# Patient Record
Sex: FEMALE | Race: BLACK OR AFRICAN AMERICAN
[De-identification: names, ages, dates, MRNs, and addresses within clinical notes are randomized per-mention and may not be internally consistent; named-entity substitution may affect disease eponyms.]

---

## 2017-04-16 NOTE — NUR
Patient discharged to home in stable condition. Written and verbal after care 
instructions given. Patient verbalizes understanding of instruction.  
ambulatory with a steady gait

## 2017-04-16 NOTE — NUR
PT AMBULATORY TO ER BED 8. PT STATES SHE HAS BEEN HVAING HEART PAIN X 3 DAYS 
AND WHEN CAR DOORS CLOSE HER HEART HURTS MORE, PT STATES HER HEART IS EATING 
ITSELF. PT NOTED ANXIOUS. PT AOX3 RR EVEN AND UNLABORED. NO SOB NOTED. NAD 
NOTED. NO NVD AT THIS TIME. PT GOWNED AND PLACED ON MONITOR WAITING FOR MD BARNES.

## 2017-05-11 NOTE — NUR
AAOX3, CAME TO ER C/O LEFT SIDE CHEST PAIN NON RADIATING SINCE THIS AM. SKIN IS 
WARM AND DRY. ASSISTED TO HOSPITAL GOWN, PLACED ON MONITOR. WILL CONTINUOUS 
MONITOR THE PATIENT. AWAITING MD FOR EVAL.

## 2018-02-12 NOTE — NUR
PT AMBULATORY TO ER BED 16. PT IS C/O HEART PALPITATION, STATES AGGRAVATED BY 
NOISE FROM HER UPSTAIR APARTMENT. HX OF SCHIZOPRENIA. GOWNED AND PLACED ON 
MONITOR. STABLE VITALS. AWAITING MD BARNES.

## 2019-07-04 NOTE — NUR
CHEST PAIN AND HEADACHE X 1 MONTH; WORSE TODAY. PATIENT A/OX4, BREATHING EVEN 
AND UNLABORED, C/O INTERMITTENT CHEST PAIN. ATTACHED TO THE MONITOR.

## 2019-07-04 NOTE — NUR
PIV REMOVED, Patient discharged to home in stable condition. Written and verbal 
after care instructions given. Patient verbalizes understanding of instruction.

## 2021-10-27 NOTE — NUR
C/O LEFT MID BACK PAIN 5/10 STARTED 10/26/21 PIMPLE ON BACK NOTED NO DRAINAGE 
OR REDNESS NOTED. PT IN GOWN AND VSS.

## 2021-10-27 NOTE — NUR
Patient does not wish to proceed with medical care recommended by Dr. Damian. 
Patient given information related to possible complications, up to and 
including death, which could occur as a result of leaving the hospital at this 
time. Patient verbalizes understanding of risks involved due to leaving against 
medical advice. Patient has signed AMA form.

## 2022-06-25 NOTE — NUR
BIBS C/O ATRUAMTIC HEADACHE X1 WEEK PRIMARILY ON THE RIGHT SIDE AND BILATERAL 
SHOULDER PAIN XMULTIPLE WEEKS. PT DENIES ANY INJURIES OR TRAUMA. PT AWAKE AND 
ALERTX4 BREATHING EVEN AND UNLABORED AMBULATORY WITH STEADY GAIT. CHANGED INTO 
GOWN AND PLACED ON MONITOR AND PULSE OX.

## 2022-06-25 NOTE — NUR
Patient discharged in stable condition. Written and verbal after care 
instructions given. Patient verbalizes understanding of instruction.

## 2022-07-22 NOTE — NUR
TO ER BED 3. BIBS FROM STREET C/O R LOWER LEG SWELLING X 8 DAYS. PT IS ALERT 
AND ORIENTED. AMBULATORY WITH STEADY GAIT. BREATHING IS EVEN AND NONLABORED. 
CONNECTED TO MONITOR. AWAITING MD BARNES

## 2022-07-22 NOTE — NUR
-------------------------------------------------------------------------------

           *** Note undone in [facility] - 07/22/22 at 0647 by CRISTINA ***            

-------------------------------------------------------------------------------

Patient discharged to home in stable condition. Written and verbal after care 
instructions given. Patient verbalizes understanding of instruction.

## 2022-07-22 NOTE — NUR
-------------------------------------------------------------------------------

           *** Note shyam in ED - 07/22/22 at 0647 by CRISTINA ***            

-------------------------------------------------------------------------------

IV removed. Catheter intact and site benign. Pressure and 4x4 applied to site. 
No bleeding noted.

## 2022-08-18 NOTE — NUR
Patient came in to the er c/o ringing of the R ear and ble edema x 1 month. On 
room air, breathing evenly and unlabored. Ambulatory with steady gait. Kept 
comfortable, will continue to monitor accordingly.

## 2022-09-23 ENCOUNTER — HOSPITAL ENCOUNTER (EMERGENCY)
Dept: HOSPITAL 54 - ER | Age: 67
Discharge: HOME | End: 2022-09-23
Payer: MEDICARE

## 2022-09-23 VITALS — BODY MASS INDEX: 32.96 KG/M2 | WEIGHT: 210 LBS | HEIGHT: 67 IN

## 2022-09-23 VITALS — SYSTOLIC BLOOD PRESSURE: 155 MMHG | DIASTOLIC BLOOD PRESSURE: 70 MMHG

## 2022-09-23 DIAGNOSIS — M19.90: ICD-10-CM

## 2022-09-23 DIAGNOSIS — Z79.899: ICD-10-CM

## 2022-09-23 DIAGNOSIS — K21.9: ICD-10-CM

## 2022-09-23 DIAGNOSIS — R60.0: Primary | ICD-10-CM

## 2022-09-23 DIAGNOSIS — Z59.00: ICD-10-CM

## 2022-09-23 LAB
ALBUMIN SERPL BCP-MCNC: 2.8 G/DL (ref 3.4–5)
ALP SERPL-CCNC: 97 U/L (ref 46–116)
ALT SERPL W P-5'-P-CCNC: 15 U/L (ref 12–78)
AST SERPL W P-5'-P-CCNC: 20 U/L (ref 15–37)
BASOPHILS # BLD AUTO: 0 K/UL (ref 0–0.2)
BASOPHILS NFR BLD AUTO: 0.2 % (ref 0–2)
BILIRUB DIRECT SERPL-MCNC: 0.1 MG/DL (ref 0–0.2)
BILIRUB SERPL-MCNC: 0.2 MG/DL (ref 0.2–1)
BILIRUB UR QL STRIP: NEGATIVE
BUN SERPL-MCNC: 11 MG/DL (ref 7–18)
CALCIUM SERPL-MCNC: 8.9 MG/DL (ref 8.5–10.1)
CHLORIDE SERPL-SCNC: 105 MMOL/L (ref 98–107)
CO2 SERPL-SCNC: 29 MMOL/L (ref 21–32)
COLOR UR: YELLOW
CREAT SERPL-MCNC: 0.8 MG/DL (ref 0.6–1.3)
EOSINOPHIL NFR BLD AUTO: 3.1 % (ref 0–6)
GLUCOSE SERPL-MCNC: 87 MG/DL (ref 74–106)
GLUCOSE UR STRIP-MCNC: NEGATIVE MG/DL
HCT VFR BLD AUTO: 35 % (ref 33–45)
HGB BLD-MCNC: 10.5 G/DL (ref 11.5–14.8)
LEUKOCYTE ESTERASE UR QL STRIP: NEGATIVE
LYMPHOCYTES NFR BLD AUTO: 2.1 K/UL (ref 0.8–4.8)
LYMPHOCYTES NFR BLD AUTO: 26.4 % (ref 20–44)
MCHC RBC AUTO-ENTMCNC: 30 G/DL (ref 31–36)
MCV RBC AUTO: 81 FL (ref 82–100)
MONOCYTES NFR BLD AUTO: 0.6 K/UL (ref 0.1–1.3)
MONOCYTES NFR BLD AUTO: 7 % (ref 2–12)
NEUTROPHILS # BLD AUTO: 5.1 K/UL (ref 1.8–8.9)
NEUTROPHILS NFR BLD AUTO: 63.3 % (ref 43–81)
NITRITE UR QL STRIP: NEGATIVE
PH UR STRIP: 6 [PH] (ref 5–8)
PLATELET # BLD AUTO: 221 K/UL (ref 150–450)
POTASSIUM SERPL-SCNC: 3.5 MMOL/L (ref 3.5–5.1)
PROT SERPL-MCNC: 7.1 G/DL (ref 6.4–8.2)
PROT UR QL STRIP: NEGATIVE MG/DL
RBC # BLD AUTO: 4.3 MIL/UL (ref 4–5.2)
SODIUM SERPL-SCNC: 140 MMOL/L (ref 136–145)
UROBILINOGEN UR STRIP-MCNC: 0.2 EU/DL
WBC NRBC COR # BLD AUTO: 8 K/UL (ref 4.3–11)

## 2022-09-23 SDOH — ECONOMIC STABILITY - HOUSING INSECURITY: HOMELESSNESS UNSPECIFIED: Z59.00

## 2022-09-23 NOTE — NUR
PATIENT BIBSELF C/O LOWER EXTREMITY PAIN BILATERAL FOR THE PAST FEW DAYS, PER 
PATIENT FELT WORSE THIS MORNING. PATIENT IS A/O X 4, RR EVEN AND UNLABORED NO 
SOB NOTED. PATIENT TAKEN TO ER BED 2. PATIENT CONNECTED TO MONITORS. VSS. NO 
ACUTE DISTRESS NOTED. PATIENT ABLE TO AMBULATE WITH STEADY GAIT.

## 2022-10-02 ENCOUNTER — HOSPITAL ENCOUNTER (EMERGENCY)
Dept: HOSPITAL 54 - ER | Age: 67
Discharge: HOME | End: 2022-10-02
Payer: MEDICARE

## 2022-10-02 VITALS — DIASTOLIC BLOOD PRESSURE: 97 MMHG | SYSTOLIC BLOOD PRESSURE: 187 MMHG

## 2022-10-02 VITALS — BODY MASS INDEX: 25.9 KG/M2 | HEIGHT: 67 IN | WEIGHT: 165 LBS

## 2022-10-02 DIAGNOSIS — Z53.21: Primary | ICD-10-CM

## 2023-02-15 ENCOUNTER — HOSPITAL ENCOUNTER (EMERGENCY)
Dept: HOSPITAL 54 - ER | Age: 68
Discharge: LEFT BEFORE DISCHARGE | End: 2023-02-15
Payer: MEDICARE

## 2023-02-15 DIAGNOSIS — Z53.21: Primary | ICD-10-CM

## 2023-02-23 ENCOUNTER — HOSPITAL ENCOUNTER (EMERGENCY)
Dept: HOSPITAL 54 - ER | Age: 68
LOS: 1 days | Discharge: HOME | End: 2023-02-24
Payer: MEDICARE

## 2023-02-23 VITALS — WEIGHT: 160 LBS | BODY MASS INDEX: 26.66 KG/M2 | HEIGHT: 65 IN

## 2023-02-23 DIAGNOSIS — K21.9: ICD-10-CM

## 2023-02-23 DIAGNOSIS — J06.9: Primary | ICD-10-CM

## 2023-02-23 DIAGNOSIS — M19.90: ICD-10-CM

## 2023-02-23 DIAGNOSIS — Z20.822: ICD-10-CM

## 2023-02-23 PROCEDURE — C9803 HOPD COVID-19 SPEC COLLECT: HCPCS

## 2023-02-23 NOTE — NUR
PT IN BED C/O COUGH WITH YELLOW MUCUS. LUNGS HAVE MILD RONCHI. AFEBRIL 100% 
O2SAT ON ROOM AIR. PT IS HOMELESS PULSES +2 EXTREMTIES EQUAL STRENGHT.

## 2023-02-24 VITALS — DIASTOLIC BLOOD PRESSURE: 68 MMHG | SYSTOLIC BLOOD PRESSURE: 121 MMHG

## 2023-02-25 ENCOUNTER — HOSPITAL ENCOUNTER (EMERGENCY)
Dept: HOSPITAL 54 - ER | Age: 68
Discharge: HOME | End: 2023-02-25
Payer: MEDICARE

## 2023-02-25 VITALS — SYSTOLIC BLOOD PRESSURE: 152 MMHG | DIASTOLIC BLOOD PRESSURE: 77 MMHG

## 2023-02-25 VITALS — BODY MASS INDEX: 29.66 KG/M2 | WEIGHT: 189 LBS | HEIGHT: 67 IN

## 2023-02-25 DIAGNOSIS — F20.9: ICD-10-CM

## 2023-02-25 DIAGNOSIS — M79.671: ICD-10-CM

## 2023-02-25 DIAGNOSIS — J06.9: Primary | ICD-10-CM

## 2023-02-25 DIAGNOSIS — Z79.899: ICD-10-CM

## 2023-02-25 DIAGNOSIS — K21.9: ICD-10-CM

## 2023-02-25 DIAGNOSIS — M79.672: ICD-10-CM

## 2023-02-25 DIAGNOSIS — Z59.00: ICD-10-CM

## 2023-02-25 LAB
BASOPHILS # BLD AUTO: 0 K/UL (ref 0–0.2)
BASOPHILS NFR BLD AUTO: 0.2 % (ref 0–2)
BUN SERPL-MCNC: 12 MG/DL (ref 7–18)
CALCIUM SERPL-MCNC: 9.4 MG/DL (ref 8.5–10.1)
CHLORIDE SERPL-SCNC: 102 MMOL/L (ref 98–107)
CO2 SERPL-SCNC: 33 MMOL/L (ref 21–32)
CREAT SERPL-MCNC: 0.7 MG/DL (ref 0.6–1.3)
EOSINOPHIL NFR BLD AUTO: 3.3 % (ref 0–6)
GLUCOSE SERPL-MCNC: 92 MG/DL (ref 74–106)
HCT VFR BLD AUTO: 36 % (ref 33–45)
HGB BLD-MCNC: 10.9 G/DL (ref 11.5–14.8)
LYMPHOCYTES NFR BLD AUTO: 2 K/UL (ref 0.8–4.8)
LYMPHOCYTES NFR BLD AUTO: 26.9 % (ref 20–44)
MCHC RBC AUTO-ENTMCNC: 31 G/DL (ref 31–36)
MCV RBC AUTO: 78 FL (ref 82–100)
MONOCYTES NFR BLD AUTO: 0.6 K/UL (ref 0.1–1.3)
MONOCYTES NFR BLD AUTO: 8.2 % (ref 2–12)
NEUTROPHILS # BLD AUTO: 4.6 K/UL (ref 1.8–8.9)
NEUTROPHILS NFR BLD AUTO: 61.4 % (ref 43–81)
PLATELET # BLD AUTO: 307 K/UL (ref 150–450)
POTASSIUM SERPL-SCNC: 3.4 MMOL/L (ref 3.5–5.1)
RBC # BLD AUTO: 4.56 MIL/UL (ref 4–5.2)
SODIUM SERPL-SCNC: 138 MMOL/L (ref 136–145)
WBC NRBC COR # BLD AUTO: 7.4 K/UL (ref 4.3–11)

## 2023-02-25 SDOH — ECONOMIC STABILITY - HOUSING INSECURITY: HOMELESSNESS UNSPECIFIED: Z59.00

## 2023-02-25 NOTE — NUR
C/O CHEST PAIN, "ACHING AND POUNDING" X 1 WEEK, ALSO C/O BILAT FOOT PAIN. PT 
AMBULATED TO BED WITH STEADY GAIT. VSS. BRAETHING EVEN AND UNLABORED. AWAITING 
MD ORDERS.

## 2023-02-25 NOTE — NUR
PT LEFT THE ER WITHOUT WAITING FOR DISCHARGE PAPERWORK. STEADY GAIT, VSS, 
BREATHING EVEN AND UNLABORED.